# Patient Record
Sex: FEMALE | Race: OTHER | NOT HISPANIC OR LATINO | URBAN - METROPOLITAN AREA
[De-identification: names, ages, dates, MRNs, and addresses within clinical notes are randomized per-mention and may not be internally consistent; named-entity substitution may affect disease eponyms.]

---

## 2020-03-27 ENCOUNTER — EMERGENCY (EMERGENCY)
Facility: HOSPITAL | Age: 70
LOS: 1 days | Discharge: HOME | End: 2020-03-27
Payer: MEDICAID

## 2020-03-27 VITALS
OXYGEN SATURATION: 96 % | SYSTOLIC BLOOD PRESSURE: 134 MMHG | TEMPERATURE: 98 F | RESPIRATION RATE: 20 BRPM | WEIGHT: 169.98 LBS | HEIGHT: 62 IN | DIASTOLIC BLOOD PRESSURE: 85 MMHG | HEART RATE: 100 BPM

## 2020-03-27 PROCEDURE — 87635 SARS-COV-2 COVID-19 AMP PRB: CPT

## 2020-03-27 PROCEDURE — 99283 EMERGENCY DEPT VISIT LOW MDM: CPT

## 2020-03-27 PROCEDURE — 99282 EMERGENCY DEPT VISIT SF MDM: CPT

## 2020-03-27 NOTE — ED PROVIDER NOTE - OBJECTIVE STATEMENT
Prefers for son to translate over the phone (Tutu: [phone]): 69 year-old female presents with cc flu-like symptoms for 1 day. Son reports that patient has had cough x few days and since yesterday started having upper back pain and body aches. Denies any chest pain, SOB, abdominal pain, urinary symptoms, rash, photophobia, neck stiffness or any other complaints.  admitted and positive for COVID-19. Was evaluated at urgent care and had CXR showing bilateral lower lobe infiltrates.

## 2020-03-27 NOTE — ED ADULT NURSE NOTE - CAS ELECT INFOMATION PROVIDED
Patient seen and examined by MD/ACP. Patient swabbed for COVID-19 and discharged by MD/ACP./DC instructions

## 2020-03-27 NOTE — ED ADULT TRIAGE NOTE - CHIEF COMPLAINT QUOTE
MY  HAS CORONA VIRUS, THE DOCTOR TOLD ME I HAVE PNA AND TO COME HERE, REPORTS FEELS LUNG HURT WHEN I BREATHE AL LITTLE BIT

## 2020-03-27 NOTE — ED PROVIDER NOTE - NSFOLLOWUPINSTRUCTIONS_ED_ALL_ED_FT
Test result will come back in about 5 days.    Today you may or may have not been tested for the COVID-19 virus. You will have to isolate yourself for the next 14 days and then you can come out of isolation as long as you are 3 days without fever (while not taking any medications for fever).    For pain or fever you can take: Tylenol 1000 mg orally every 6 hours as needed. (Maximum 4000 mg in 24 hours).    Stay well-hydrated by drinking plenty of water daily.    ** Come back to the emergency room if you start having SHORTNESS OF BREATH, if your symptoms are worsening or if you are concerned. Otherwise stay home and isolate yourself.    **If you have friends or family members that have mild symptoms that may be due to the virus tell them to stay home    Stay home: People who are mildly ill with COVID-19 are able to recover at home. Do not leave, except to get medical care. Do not visit public areas.  Stay in touch with your doctor. Call before you get medical care. Be sure to get care if you feel worse or you think it is an emergency.  Avoid public transportation: Avoid using public transportation, ride-sharing, or taxis.  Stay away from others: As much as possible, you should stay in a specific “sick room” and away from other people in your home. Use a separate bathroom, if available.  Limit contact with pets & animals: You should restrict contact with pets and other animals, just like you would around other people.  Although there have not been reports of pets or other animals becoming sick with COVID-19, it is still recommended that people with the virus limit contact with animals until more information is known.  If you are sick: You should wear a facemask when you are around other people and before you enter a healthcare provider’s office.  If you are caring for others: If the person who is sick is not able to wear a facemask (for example, because it causes trouble breathing), then people who live in the home should stay in a different room. When caregivers enter the room of the sick person, they should wear a facemask. Visitors, other than caregivers, are not recommended.  Cover: Cover your mouth and nose with a tissue when you cough or sneeze.  Dispose: Throw used tissues in a lined trash can.  Wash hands: Immediately wash your hands with soap and water for at least 20 seconds. If soap and water are not available, clean your hands with an alcohol-based hand  that contains at least 60% alcohol.  Hand : If soap and water are not available, use an alcohol-based hand  with at least 60% alcohol, covering all surfaces of your hands and rubbing them together until they feel dry.  Soap and water: Soap and water are the best option, especially if hands are visibly dirty.  Avoid touching: Avoid touching your eyes, nose, and mouth with unwashed hands.  Do not share: Do not share dishes, drinking glasses, cups, eating utensils, towels, or bedding with other people in your home.  Wash thoroughly after use: After using these items, wash them thoroughly with soap and water or put in the .  Clean and disinfect: Routinely clean high-touch surfaces in your “sick room” and bathroom. Let someone else clean and disinfect surfaces in common areas, but not your bedroom and bathroom.  If a caregiver or other person needs to clean and disinfect a sick person’s bedroom or bathroom, they should do so on an as-needed basis. The caregiver/other person should wear a mask and wait as long as possible after the sick person has used the bathroom.  High-touch surfaces include phones, remote controls, counters, tabletops, doorknobs, bathroom fixtures, toilets, keyboards, tablets, and bedside tables.  Clean and disinfect areas that may have blood, stool, or body fluids on them.  Seek medical attention, but call first: Seek medical care right away if your illness is worsening (for example, if you have difficulty breathing).  Call your doctor before going in: Before going to the doctor’s office or emergency room, call ahead and tell them your symptoms. They will tell you what to do.  Wear a facemask: If possible, put on a facemask before you enter the building. If you can’t put on a facemask, try to keep a safe distance from other people (at least 6 feet away). This will help protect the people in the office or waiting room.  Follow care instructions from your healthcare provider and local health department: Your local health authorities will give instructions on checking your symptoms and reporting information.  Call 911 if you have a medical emergency: If you have a medical emergency and need to call 911, notify the  that you have or think you might have, COVID-19. If possible, put on a facemask before medical help arrives.

## 2020-03-27 NOTE — ED PROVIDER NOTE - CLINICAL SUMMARY MEDICAL DECISION MAKING FREE TEXT BOX
Well-appearing, vitals stable. O2 sat WNL. No signs of distress. No SPEARS. High suspicion of COVID-19 infection. Will swab. Will discharge with isolation precautions and strict return instructions, discussed with son over the phone and patient informed by son. Questions answered.

## 2020-03-27 NOTE — ED PROVIDER NOTE - PHYSICAL EXAMINATION
CONSTITUTIONAL: Well appearing, awake, alert, oriented to person, place, time/situation and in no apparent distress. EYES: Clear bilaterally, pupils equal, round and reactive to light. CARDIAC: Normal rate, regular rhythm.  Heart sounds S1, S2. RESPIRATORY: Breath sounds clear and equal bilaterally. O2 sat 97% RA after ambulating for 30 seconds. No use of accessory muscles or signs of distress. GASTROINTESTINAL: Abdomen soft, non-tender, no guarding. NEUROLOGICAL: Alert and oriented, no focal deficits, grossly intact. SKIN: Skin normal color for race, warm, dry and intact. No evidence of rash.

## 2020-03-28 LAB — SARS-COV-2 RNA SPEC QL NAA+PROBE: DETECTED

## 2024-12-11 ENCOUNTER — INPATIENT (INPATIENT)
Facility: HOSPITAL | Age: 74
LOS: 1 days | Discharge: HOME | DRG: 189 | End: 2024-12-13
Attending: INTERNAL MEDICINE | Admitting: INTERNAL MEDICINE
Payer: MEDICAID

## 2024-12-11 VITALS
SYSTOLIC BLOOD PRESSURE: 146 MMHG | TEMPERATURE: 98 F | RESPIRATION RATE: 16 BRPM | DIASTOLIC BLOOD PRESSURE: 79 MMHG | HEART RATE: 80 BPM | HEIGHT: 62 IN | WEIGHT: 149.91 LBS | OXYGEN SATURATION: 90 %

## 2024-12-11 DIAGNOSIS — Z29.9 ENCOUNTER FOR PROPHYLACTIC MEASURES, UNSPECIFIED: ICD-10-CM

## 2024-12-11 DIAGNOSIS — I10 ESSENTIAL (PRIMARY) HYPERTENSION: ICD-10-CM

## 2024-12-11 DIAGNOSIS — R93.89 ABNORMAL FINDINGS ON DIAGNOSTIC IMAGING OF OTHER SPECIFIED BODY STRUCTURES: ICD-10-CM

## 2024-12-11 DIAGNOSIS — J96.01 ACUTE RESPIRATORY FAILURE WITH HYPOXIA: ICD-10-CM

## 2024-12-11 DIAGNOSIS — J40 BRONCHITIS, NOT SPECIFIED AS ACUTE OR CHRONIC: ICD-10-CM

## 2024-12-11 LAB
ALBUMIN SERPL ELPH-MCNC: 3.7 G/DL — SIGNIFICANT CHANGE UP (ref 3.5–5)
ALP SERPL-CCNC: 77 U/L — SIGNIFICANT CHANGE UP (ref 40–120)
ALT FLD-CCNC: 17 U/L DA — SIGNIFICANT CHANGE UP (ref 10–60)
ANION GAP SERPL CALC-SCNC: 3 MMOL/L — LOW (ref 5–17)
APTT BLD: 27.5 SEC — SIGNIFICANT CHANGE UP (ref 24.5–35.6)
AST SERPL-CCNC: 9 U/L — LOW (ref 10–40)
BASE EXCESS BLDV CALC-SCNC: 3 MMOL/L — SIGNIFICANT CHANGE UP
BASOPHILS # BLD AUTO: 0.03 K/UL — SIGNIFICANT CHANGE UP (ref 0–0.2)
BASOPHILS NFR BLD AUTO: 0.3 % — SIGNIFICANT CHANGE UP (ref 0–2)
BILIRUB SERPL-MCNC: 0.6 MG/DL — SIGNIFICANT CHANGE UP (ref 0.2–1.2)
BUN SERPL-MCNC: 19 MG/DL — HIGH (ref 7–18)
CALCIUM SERPL-MCNC: 8.8 MG/DL — SIGNIFICANT CHANGE UP (ref 8.4–10.5)
CHLORIDE SERPL-SCNC: 112 MMOL/L — HIGH (ref 96–108)
CO2 SERPL-SCNC: 28 MMOL/L — SIGNIFICANT CHANGE UP (ref 22–31)
CREAT SERPL-MCNC: 0.67 MG/DL — SIGNIFICANT CHANGE UP (ref 0.5–1.3)
EGFR: 93 ML/MIN/1.73M2 — SIGNIFICANT CHANGE UP
EOSINOPHIL # BLD AUTO: 0.11 K/UL — SIGNIFICANT CHANGE UP (ref 0–0.5)
EOSINOPHIL NFR BLD AUTO: 1.2 % — SIGNIFICANT CHANGE UP (ref 0–6)
FLUAV AG NPH QL: SIGNIFICANT CHANGE UP
FLUBV AG NPH QL: SIGNIFICANT CHANGE UP
GLUCOSE SERPL-MCNC: 102 MG/DL — HIGH (ref 70–99)
HCO3 BLDV-SCNC: 29 MMOL/L — SIGNIFICANT CHANGE UP (ref 22–29)
HCT VFR BLD CALC: 39.9 % — SIGNIFICANT CHANGE UP (ref 34.5–45)
HGB BLD-MCNC: 13.2 G/DL — SIGNIFICANT CHANGE UP (ref 11.5–15.5)
IMM GRANULOCYTES NFR BLD AUTO: 0.4 % — SIGNIFICANT CHANGE UP (ref 0–0.9)
INR BLD: 0.95 RATIO — SIGNIFICANT CHANGE UP (ref 0.85–1.16)
LYMPHOCYTES # BLD AUTO: 1.61 K/UL — SIGNIFICANT CHANGE UP (ref 1–3.3)
LYMPHOCYTES # BLD AUTO: 17.1 % — SIGNIFICANT CHANGE UP (ref 13–44)
MAGNESIUM SERPL-MCNC: 2.4 MG/DL — SIGNIFICANT CHANGE UP (ref 1.6–2.6)
MCHC RBC-ENTMCNC: 30 PG — SIGNIFICANT CHANGE UP (ref 27–34)
MCHC RBC-ENTMCNC: 33.1 G/DL — SIGNIFICANT CHANGE UP (ref 32–36)
MCV RBC AUTO: 90.7 FL — SIGNIFICANT CHANGE UP (ref 80–100)
MONOCYTES # BLD AUTO: 0.7 K/UL — SIGNIFICANT CHANGE UP (ref 0–0.9)
MONOCYTES NFR BLD AUTO: 7.4 % — SIGNIFICANT CHANGE UP (ref 2–14)
NEUTROPHILS # BLD AUTO: 6.93 K/UL — SIGNIFICANT CHANGE UP (ref 1.8–7.4)
NEUTROPHILS NFR BLD AUTO: 73.6 % — SIGNIFICANT CHANGE UP (ref 43–77)
NRBC # BLD: 0 /100 WBCS — SIGNIFICANT CHANGE UP (ref 0–0)
NT-PROBNP SERPL-SCNC: 174 PG/ML — HIGH (ref 0–125)
PCO2 BLDV: 49 MMHG — HIGH (ref 39–42)
PH BLDV: 7.38 — SIGNIFICANT CHANGE UP (ref 7.32–7.43)
PHOSPHATE SERPL-MCNC: 1.8 MG/DL — LOW (ref 2.5–4.5)
PLATELET # BLD AUTO: 283 K/UL — SIGNIFICANT CHANGE UP (ref 150–400)
PO2 BLDV: 26 MMHG — SIGNIFICANT CHANGE UP
POTASSIUM SERPL-MCNC: 4.4 MMOL/L — SIGNIFICANT CHANGE UP (ref 3.5–5.3)
POTASSIUM SERPL-SCNC: 4.4 MMOL/L — SIGNIFICANT CHANGE UP (ref 3.5–5.3)
PROT SERPL-MCNC: 6.7 G/DL — SIGNIFICANT CHANGE UP (ref 6–8.3)
PROTHROM AB SERPL-ACNC: 11 SEC — SIGNIFICANT CHANGE UP (ref 9.9–13.4)
RBC # BLD: 4.4 M/UL — SIGNIFICANT CHANGE UP (ref 3.8–5.2)
RBC # FLD: 12.1 % — SIGNIFICANT CHANGE UP (ref 10.3–14.5)
RSV RNA NPH QL NAA+NON-PROBE: SIGNIFICANT CHANGE UP
SAO2 % BLDV: 37.6 % — SIGNIFICANT CHANGE UP
SARS-COV-2 RNA SPEC QL NAA+PROBE: SIGNIFICANT CHANGE UP
SODIUM SERPL-SCNC: 143 MMOL/L — SIGNIFICANT CHANGE UP (ref 135–145)
TROPONIN I, HIGH SENSITIVITY RESULT: 56.1 NG/L — HIGH
TROPONIN I, HIGH SENSITIVITY RESULT: 65.4 NG/L — HIGH
TROPONIN I, HIGH SENSITIVITY RESULT: 70.4 NG/L — HIGH
WBC # BLD: 9.42 K/UL — SIGNIFICANT CHANGE UP (ref 3.8–10.5)
WBC # FLD AUTO: 9.42 K/UL — SIGNIFICANT CHANGE UP (ref 3.8–10.5)

## 2024-12-11 PROCEDURE — 99285 EMERGENCY DEPT VISIT HI MDM: CPT

## 2024-12-11 PROCEDURE — 71275 CT ANGIOGRAPHY CHEST: CPT | Mod: 26,MC

## 2024-12-11 PROCEDURE — 71045 X-RAY EXAM CHEST 1 VIEW: CPT | Mod: 26

## 2024-12-11 PROCEDURE — 99223 1ST HOSP IP/OBS HIGH 75: CPT | Mod: GC

## 2024-12-11 RX ORDER — ACETAMINOPHEN 500MG 500 MG/1
650 TABLET, COATED ORAL EVERY 6 HOURS
Refills: 0 | Status: DISCONTINUED | OUTPATIENT
Start: 2024-12-11 | End: 2024-12-13

## 2024-12-11 RX ORDER — ALBUTEROL 90 MCG
2.5 AEROSOL (GRAM) INHALATION
Refills: 0 | Status: DISCONTINUED | OUTPATIENT
Start: 2024-12-11 | End: 2024-12-11

## 2024-12-11 RX ORDER — GUAIFENESIN/DEXTROMETHORPHAN 100-10MG/5
10 SYRUP ORAL EVERY 4 HOURS
Refills: 0 | Status: DISCONTINUED | OUTPATIENT
Start: 2024-12-11 | End: 2024-12-13

## 2024-12-11 RX ORDER — AMLODIPINE BESYLATE 10 MG/1
5 TABLET ORAL DAILY
Refills: 0 | Status: DISCONTINUED | OUTPATIENT
Start: 2024-12-11 | End: 2024-12-13

## 2024-12-11 RX ORDER — GUAIFENESIN/DEXTROMETHORPHAN 100-10MG/5
10 SYRUP ORAL EVERY 4 HOURS
Refills: 0 | Status: DISCONTINUED | OUTPATIENT
Start: 2024-12-11 | End: 2024-12-11

## 2024-12-11 RX ORDER — ENOXAPARIN SODIUM 30 MG/.3ML
40 INJECTION SUBCUTANEOUS EVERY 24 HOURS
Refills: 0 | Status: DISCONTINUED | OUTPATIENT
Start: 2024-12-11 | End: 2024-12-13

## 2024-12-11 RX ORDER — METHYLPREDNISOLONE SOD SUCC 125 MG
125 VIAL (EA) INJECTION ONCE
Refills: 0 | Status: COMPLETED | OUTPATIENT
Start: 2024-12-11 | End: 2024-12-11

## 2024-12-11 RX ORDER — IPRATROPIUM BROMIDE AND ALBUTEROL SULFATE 2.5; .5 MG/3ML; MG/3ML
3 SOLUTION RESPIRATORY (INHALATION) EVERY 6 HOURS
Refills: 0 | Status: DISCONTINUED | OUTPATIENT
Start: 2024-12-11 | End: 2024-12-13

## 2024-12-11 RX ORDER — MELOXICAM 7.5 MG/1
1 TABLET ORAL
Refills: 0 | DISCHARGE

## 2024-12-11 RX ORDER — SODIUM,POTASSIUM PHOSPHATES 278-250MG
1 POWDER IN PACKET (EA) ORAL ONCE
Refills: 0 | Status: COMPLETED | OUTPATIENT
Start: 2024-12-11 | End: 2024-12-11

## 2024-12-11 RX ORDER — IPRATROPIUM BROMIDE AND ALBUTEROL SULFATE 2.5; .5 MG/3ML; MG/3ML
3 SOLUTION RESPIRATORY (INHALATION) EVERY 6 HOURS
Refills: 0 | Status: DISCONTINUED | OUTPATIENT
Start: 2024-12-11 | End: 2024-12-11

## 2024-12-11 RX ORDER — AMLODIPINE BESYLATE 10 MG/1
1 TABLET ORAL
Refills: 0 | DISCHARGE

## 2024-12-11 RX ORDER — IPRATROPIUM BROMIDE AND ALBUTEROL SULFATE 2.5; .5 MG/3ML; MG/3ML
3 SOLUTION RESPIRATORY (INHALATION) ONCE
Refills: 0 | Status: COMPLETED | OUTPATIENT
Start: 2024-12-11 | End: 2024-12-11

## 2024-12-11 RX ADMIN — IPRATROPIUM BROMIDE AND ALBUTEROL SULFATE 3 MILLILITER(S): 2.5; .5 SOLUTION RESPIRATORY (INHALATION) at 16:20

## 2024-12-11 RX ADMIN — Medication 10 MILLILITER(S): at 22:01

## 2024-12-11 RX ADMIN — Medication 1 PACKET(S): at 17:20

## 2024-12-11 RX ADMIN — ENOXAPARIN SODIUM 40 MILLIGRAM(S): 30 INJECTION SUBCUTANEOUS at 22:01

## 2024-12-11 RX ADMIN — Medication 125 MILLIGRAM(S): at 17:20

## 2024-12-11 RX ADMIN — IPRATROPIUM BROMIDE AND ALBUTEROL SULFATE 3 MILLILITER(S): 2.5; .5 SOLUTION RESPIRATORY (INHALATION) at 12:40

## 2024-12-11 NOTE — H&P ADULT - HISTORY OF PRESENT ILLNESS
Patient is a 74 year old female from home, ambulates independently, with PMH of HTN, and remote hisotory of colon cancer who presented to the ED with shortness of breath.  Patients son at bedside assisting with history.  Patient states she has felt sick for the past 1-2 weeks.  Patient states she has had cough and fever for this time.  patient denies sick contact.  patient states with the coughing she has had b/l chest wall discomfort.  Patient went to urgent care for this shortness of breath this morning and was told to come to the hospital due to her hypoxia. Patient denies nausea, vomiting, headache, blurry vision, dizziness, chest pain, abdominal pain, constipation, diarrhea, leg swelling.  Patient is a 74 year old female from home, ambulates independently, with PMH of HTN, and remote history of colon cancer s/p treatment in early 2000s, who presented to the ED with SOB.  Patients son at bedside assisting with history.  Patient states she has felt sick for the past 1-2 weeks since Thanksgiving.  Initially, patient states her symptoms began with fevers for 4d, and dry cough. Patient now states she has worsening R-sided breast and back pain with deep breaths, 7/10 in intensity. Denies rhinorrhea, sick contact, travel, abdominal pain, nausea/vomiting, chest pain, chills, or urinary symptoms. Of note, patient went to urgent care for this shortness of breath this morning and was told to come to the hospital due to her hypoxia.     In the ED patient received duonebs, solumedrol, KPhos. VSS, on RA saturating 95-96%

## 2024-12-11 NOTE — ED ADULT NURSE NOTE - NS ED NURSE LEVEL OF CONSCIOUSNESS MENTAL STATUS
Date Performed: 01/01/2018       Time Performed: 02:50:25

 

PTAGE:      58 years

 

EKG:      ATRIAL FIBRILLATION WITH RAPID VENTRICULAR RESPONSE NONSPECIFIC ST & T-WAVE ABNORMALITY ABN
ORMAL ECG No significant change from prior electrocardiogram. 

 

 PREVIOUS TRACING            : 11/28/2017 15.12

 

DOCTOR:   Luis Alberto Hightower  Interpretating Date/Time  01/01/2018 09:04:25
Date Performed: 01/01/2018       Time Performed: 10:34:01

 

PTAGE:      58 years

 

EKG:      ATRIAL FIBRILLATION WITH RAPID VENTRICULAR RESPONSE WITH ABERRANT CONDUCTION OR VENTRICULAR
 PREMATURE COMPLEXES NONSPECIFIC ST & T-WAVE ABNORMALITY ABNORMAL RHYTHM ECG INTERPRETATION BASED ON 
A DEFAULT AGE OF 40 YEARS 

 

 PREVIOUS TRACING            : 01/01/2018 02.50 Compared to prior tracing no significant change

 

DOCTOR:   Claudine Brooks  Interpretating Date/Time  01/02/2018 17:34:21
Awake/Alert

## 2024-12-11 NOTE — H&P ADULT - NSHPREVIEWOFSYSTEMS_GEN_ALL_CORE
CONSTITUTIONAL: No fever, weight loss, or fatigue  RESPIRATORY: +cough, wheezing, chills or hemoptysis; +shortness of breath  CARDIOVASCULAR: No chest pain, palpitations, dizziness, or leg swelling  GASTROINTESTINAL: No abdominal pain. No nausea, vomiting, or hematemesis; No diarrhea or constipation. No melena or hematochezia.  GENITOURINARY: No dysuria or hematuria, urinary frequency  NEUROLOGICAL: No headaches, memory loss, loss of strength, numbness, or tremors  ENDOCRINE: No polyuria, polydipsia, or heat/cold intolerance  MUSKULOSKELETAL: No muscle aches, joint pains  HEME: no easy bruisability, no tender or enlarged lymph nodes  SKIN: No itching, burning, rashes, or lesions .

## 2024-12-11 NOTE — H&P ADULT - ASSESSMENT
Patient is a 74 year old female from home, ambulates independently, with PMH of HTN, and remote hisotory of colon cancer who presented to the ED with shortness of breath.  In the ED patient hemodynamically stable and afebrile on 2L NC.  Patients labs grossly unrevaling.  Patient is being admitted to medicine for. Patient is a 74 year old female from home, ambulates independently, with PMH of HTN, and remote history of colon cancer who presented to the ED with shortness of breath.  In the ED patient hemodynamically stable and afebrile on 2L NC.  Patients labs grossly unrevaling.  Patient is being admitted to medicine for Acute hypoxic respiratory failure 2/2 bronchitis  Patient is a 74 year old female from home, ambulates independently, with PMH of HTN, and remote history of colon cancer who presented to the ED with shortness of breath.  In the ED patient hemodynamically stable and afebrile on RA.  Patients labs grossly unrevealing  Patient is being admitted to medicine for Acute hypoxic respiratory failure 2/2 viral bronchitis

## 2024-12-11 NOTE — H&P ADULT - PROBLEM SELECTOR PLAN 1
- Presented with URI symptoms (shortness of breath and cough)  - Hemodynamically stable and afebrile  - WBC 9  - Lactate <2  - CT Chest showing  Mild bilateral bronchial wall thickening with mucoid impaction  - CXR negative for acute intrathoracic pathology  - S/P solumedrol 125mg and Duoneb in the ED  - HOLD antibiotics for now  - Start duonebs  - Start expecterant   - Supportive care  - IV Fluids - Presented with URI symptoms (shortness of breath and cough)  - Hemodynamically stable and afebrile  - WBC 9  - Lactate <2  - CT Chest showing  Mild bilateral bronchial wall thickening with mucoid impaction  - CXR negative for acute intrathoracic pathology  - S/P solumedrol 125mg and Duoneb in the ED  - HOLD antibiotics for now  - Currently on 2L NC, titrate down as tolerated  - Tylenol PRN  - Start Duoneb  - Start expecterant   - Supportive care  - IV Fluids - Presented with URI symptoms (shortness of breath and cough)  - Hemodynamically stable and afebrile  - WBC 9  - Lactate <2, Trop 56   - CT Chest showing  Mild bilateral bronchial wall thickening with mucoid impaction  - CXR negative for acute intrathoracic pathology  - S/P solumedrol 125mg and Duoneb in the ED  - HOLD antibiotics for now  - Weaned off supplemental O2  - Tylenol PRN  - Start Duoneb  - Start robitussin  - Supportive care  - f/u repeat Trop

## 2024-12-11 NOTE — ED PROVIDER NOTE - PHYSICAL EXAMINATION
CONSTITUTIONAL: non-toxic, well appearing  SKIN: no rash, no petechiae.  EYES: pink conjunctiva, anicteric  NECK: Supple; no meningismus, no JVD  CARD: RRR, no murmurs, equal radial pulses bilaterally 2+  RESP: Diminished breath sounds bilaterally with end expiratory wheeze, no respiratory distress  ABD: Soft, non-tender, non-distended, no peritoneal signs, no CVA tenderness  EXT: Normal ROM x4. No edema. No calf tenderness  NEURO: Alert, oriented. Neuro exam nonfocal, equal strength bilaterally  PSYCH: Cooperative, appropriate.

## 2024-12-11 NOTE — H&P ADULT - PROBLEM SELECTOR PLAN 4
- Patient with history of HTN  - Patient on Amlodipine 5mg at home  - Continue home antihypertensives with holding parameters

## 2024-12-11 NOTE — ED ADULT NURSE NOTE - OBJECTIVE STATEMENT
Patient is BIB son  c/o coughing  and difficulty to breath x 2 weeks, c/o anterior rib pain with breathing in, Patient is placed on cardiac monitor, EKG performed and MD juan bedside. Patient is placed on 2LNC and sating at 93%. Patient denies N/V/D.

## 2024-12-11 NOTE — ED ADULT NURSE NOTE - NSFALLRISKINTERV_ED_ALL_ED

## 2024-12-11 NOTE — ED PROVIDER NOTE - OBJECTIVE STATEMENT
Patient declines Nate , son at bedside assisting interpretation.    74-year-old female with past medical history hypertension, remote history of colon cancer presents with difficulty breathing over the past 2 weeks, worsening today.  Patient reports dry cough, bilateral chest and back discomfort, worse with deep breathing and coughing of the past 2 weeks.  Denies any fevers, nasal congestion, sore throat, abdominal pain, vomiting, diarrhea, dysuria, numbness, focal weakness, rash.  Denies any history of asthma or tobacco use.  Patient seen urgent care prior to arrival, advised to the emergency department for further evaluation.  Denies any additional complaints.

## 2024-12-11 NOTE — H&P ADULT - NSHPPHYSICALEXAM_GEN_ALL_CORE
T(C): 37.1 (12-11-24 @ 15:33), Max: 37.1 (12-11-24 @ 15:33)  T(F): 98.7 (12-11-24 @ 15:33), Max: 98.7 (12-11-24 @ 15:33)  HR: 79 (12-11-24 @ 15:33) (78 - 80)  BP: 124/79 (12-11-24 @ 15:33) (124/79 - 146/79)  RR: 16 (12-11-24 @ 15:33) (16 - 16)  SpO2: 94% (12-11-24 @ 15:33) (90% - 94%)      GENERAL: NAD; lying in bed  HEAD:  Atraumatic, Normocephalic  EYES: EOMI, PERRLA, conjunctiva and sclera clear  ENMT: No tonsillar erythema, exudates, or enlargement; Moist mucous membranes  NECK: Supple, normal appearance, No JVD; Normal thyroid; Trachea midline  NERVOUS SYSTEM:  Alert & Oriented X3,  Motor Strength 5/5 B/L upper and lower extremities, sensation intact  CHEST/LUNG: Lungs clear to auscultation bilaterally, No rales, rhonchi, wheezing   HEART: Regular rate and rhythm; No murmurs, rubs, or gallops  ABDOMEN: Soft, Nontender, Nondistended; Bowel sounds present  EXTREMITIES:  2+ Peripheral Pulses, No clubbing, cyanosis, or edema  SKIN: No rashes or lesions; T(C): 37.1 (12-11-24 @ 15:33), Max: 37.1 (12-11-24 @ 15:33)  T(F): 98.7 (12-11-24 @ 15:33), Max: 98.7 (12-11-24 @ 15:33)  HR: 79 (12-11-24 @ 15:33) (78 - 80)  BP: 124/79 (12-11-24 @ 15:33) (124/79 - 146/79)  RR: 16 (12-11-24 @ 15:33) (16 - 16)  SpO2: 94% (12-11-24 @ 15:33) (90% - 94%)      GENERAL: NAD; lying in bed  HEAD:  Atraumatic, Normocephalic  EYES: EOMI, PERRLA, conjunctiva and sclera clear  ENMT: No tonsillar erythema, exudates, or enlargement; Moist mucous membranes  NECK: Supple, normal appearance, No JVD; Normal thyroid; Trachea midline  NERVOUS SYSTEM:  Alert & Oriented X3,  Motor Strength 5/5 B/L upper and lower extremities, sensation intact  CHEST/LUNG: CTLA. No rales, rhonchi, wheezing   HEART: Regular rate and rhythm; No murmurs, rubs, or gallops  ABDOMEN: Soft, Nontender, Nondistended; Bowel sounds present  EXTREMITIES:  2+ Peripheral Pulses, No clubbing, cyanosis, or edema  SKIN: No rashes or lesions;

## 2024-12-11 NOTE — H&P ADULT - REASON FOR ADMISSION
Acute hypoxic respiratory failure 2/2 community acquired pneumonia Acute hypoxic respiratory failure 2/2 bronchitis

## 2024-12-11 NOTE — H&P ADULT - PROBLEM SELECTOR PLAN 3
-CT Chest:   - No pulmonary embolism, Mild bilateral bronchial wall thickening with mucoid impaction.   - Filling defect in the left atrial appendage may represent thrombus versus slow flow of contrast.   - Heterogeneous appearing thyroid with hypodense nodule.    - Will f/u Echo -CT Chest:   - No pulmonary embolism, Mild bilateral bronchial wall thickening with mucoid impaction.   - Filling defect in the left atrial appendage may represent thrombus versus slow flow of contrast.   - Heterogeneous appearing thyroid with hypodense nodule.    - Will f/u Echo  - Thyroid US outpatient

## 2024-12-11 NOTE — ED PROVIDER NOTE - CLINICAL SUMMARY MEDICAL DECISION MAKING FREE TEXT BOX
Lee: 74-year-old female with past medical history hypertension, remote history of colon cancer presents with difficulty breathing over the past 2 weeks, worsening today.  Patient reports dry cough, bilateral chest and back discomfort, worse with deep breathing and coughing of the past 2 weeks.  Denies any fevers, nasal congestion, sore throat, abdominal pain, vomiting, diarrhea, dysuria, numbness, focal weakness, rash.  Denies any history of asthma or tobacco use.  Patient seen urgent care prior to arrival, advised to the emergency department for further evaluation.  Physical exam per above. Patient hypoxic to 89% on RA requiring 2L NC. Unclear etiology, ddx includes but not limited to infectious, fluid overload, PE. Will obtain labs, imaging, provide supportive treatment with dispo pending workup.

## 2024-12-11 NOTE — ED PROVIDER NOTE - PROGRESS NOTE DETAILS
Wiliamks-DO: Patient with elevated troponin, persistently hypoxic requiring 2 L nasal cannula, will admit.  Discussed with hospitalist and MAR regarding admission.

## 2024-12-11 NOTE — H&P ADULT - ATTENDING COMMENTS
Ms. Wallace is an Dutch speaking 74 year old female from home, ambulates independently, with PMH of HTN, and remote history of colon cancer who presented to the ED with shortness of breath. Patient's son assisted w/ hs, he informed that she has been sick for last 1-2 weeks- had fever, cough and chills. He thinks her cough is better but she c/o Rt sided chest pain with breathing, hence,  he brought her to the urgent care. She was hypoxic at  and was advised to visit ER.     In ED, initial VS were stable except SPO2 90% on RA, she was placed on NC- O2 sats improved to 94%     Labs reviewed- cbc, bmp, pro-bnp, CE     PE as above     CT Angio chest reviewed- No pulmonary embolism. Mild bilateral bronchial wall thickening with mucoid impaction.     A/P:  #Acute Hypoxic respiratory failure   #Viral bronchitis   #? Atrial thrombus vs delayed contrast   #Elevated CE- suspect demand   #HTN  #Thyroid nodule   #DVT ppx     Plan:  --Patient p/w sob, pleuritic chest pain and hypoxia. CT Angio chest-No pulmonary embolism. Mild bilateral bronchial wall thickening with mucoid impaction. Filling defect in the left atrial appendage may represent thrombus versus slow flow of contrast.  -Patient is likely recovering from a viral infection (her sister was also sick w/ URI), CT w/ some mucoid plugging noticed.   -Hold off abx as no fevers, WBC or obvious consolidation   -Start duonebs, Mucinex for mucolytic effect   -Weaned off to RA, latest sats 93-94%   -Given abnormal CT finding of possible atrial thrombus or delayed contrast, would get ECHO for better evaluation   -Trend CE   -US thyroid as out-patient   -Lovenox SC

## 2024-12-11 NOTE — H&P ADULT - NSICDXPASTMEDICALHX_GEN_ALL_CORE_FT
PAST MEDICAL HISTORY:  Colon cancer     COPD (chronic obstructive pulmonary disease)     HTN (hypertension)      PAST MEDICAL HISTORY:  Colon cancer     HTN (hypertension)

## 2024-12-11 NOTE — H&P ADULT - ATTENDING SUPERVISION STATEMENT
-- DO NOT REPLY / DO NOT REPLY ALL --  -- Message is from the [facility]--    General Patient Message      Reason for Call: Patient is requesting a call back from Dr. Rocio Aguilera's office, patient advsied she received a voicemail regarding issues with insurance not authorizing Thyroid Imagining. Please give a call back as soon as possible, patient is scheduled for imaging tomorrow 04/12/22 at 9:00 am.     Caller Information       Type Contact Phone    04/11/2022 11:11 AM CDT Phone (Incoming) Brenda Tucker (Self) [phone] (M)          Alternative phone number: none    Turnaround time given to caller:   \"This message will be sent to [state Provider's name]. The clinical team will fulfill your request as soon as they review your message.\"     Resident

## 2024-12-12 DIAGNOSIS — Z75.8 OTHER PROBLEMS RELATED TO MEDICAL FACILITIES AND OTHER HEALTH CARE: ICD-10-CM

## 2024-12-12 LAB
ANION GAP SERPL CALC-SCNC: 6 MMOL/L — SIGNIFICANT CHANGE UP (ref 5–17)
BUN SERPL-MCNC: 21 MG/DL — HIGH (ref 7–18)
CALCIUM SERPL-MCNC: 8.9 MG/DL — SIGNIFICANT CHANGE UP (ref 8.4–10.5)
CHLORIDE SERPL-SCNC: 112 MMOL/L — HIGH (ref 96–108)
CO2 SERPL-SCNC: 25 MMOL/L — SIGNIFICANT CHANGE UP (ref 22–31)
CREAT SERPL-MCNC: 0.7 MG/DL — SIGNIFICANT CHANGE UP (ref 0.5–1.3)
EGFR: 92 ML/MIN/1.73M2 — SIGNIFICANT CHANGE UP
GLUCOSE BLDC GLUCOMTR-MCNC: 91 MG/DL — SIGNIFICANT CHANGE UP (ref 70–99)
GLUCOSE SERPL-MCNC: 238 MG/DL — HIGH (ref 70–99)
HCT VFR BLD CALC: 40 % — SIGNIFICANT CHANGE UP (ref 34.5–45)
HGB BLD-MCNC: 13.2 G/DL — SIGNIFICANT CHANGE UP (ref 11.5–15.5)
MAGNESIUM SERPL-MCNC: 2.4 MG/DL — SIGNIFICANT CHANGE UP (ref 1.6–2.6)
MCHC RBC-ENTMCNC: 29.3 PG — SIGNIFICANT CHANGE UP (ref 27–34)
MCHC RBC-ENTMCNC: 33 G/DL — SIGNIFICANT CHANGE UP (ref 32–36)
MCV RBC AUTO: 88.9 FL — SIGNIFICANT CHANGE UP (ref 80–100)
NRBC # BLD: 0 /100 WBCS — SIGNIFICANT CHANGE UP (ref 0–0)
PHOSPHATE SERPL-MCNC: 2.7 MG/DL — SIGNIFICANT CHANGE UP (ref 2.5–4.5)
PLATELET # BLD AUTO: 306 K/UL — SIGNIFICANT CHANGE UP (ref 150–400)
POTASSIUM SERPL-MCNC: 3.7 MMOL/L — SIGNIFICANT CHANGE UP (ref 3.5–5.3)
POTASSIUM SERPL-SCNC: 3.7 MMOL/L — SIGNIFICANT CHANGE UP (ref 3.5–5.3)
RBC # BLD: 4.5 M/UL — SIGNIFICANT CHANGE UP (ref 3.8–5.2)
RBC # FLD: 12.1 % — SIGNIFICANT CHANGE UP (ref 10.3–14.5)
SODIUM SERPL-SCNC: 143 MMOL/L — SIGNIFICANT CHANGE UP (ref 135–145)
WBC # BLD: 7.89 K/UL — SIGNIFICANT CHANGE UP (ref 3.8–10.5)
WBC # FLD AUTO: 7.89 K/UL — SIGNIFICANT CHANGE UP (ref 3.8–10.5)

## 2024-12-12 PROCEDURE — 93306 TTE W/DOPPLER COMPLETE: CPT | Mod: 26

## 2024-12-12 PROCEDURE — 99233 SBSQ HOSP IP/OBS HIGH 50: CPT

## 2024-12-12 RX ADMIN — Medication 10 MILLILITER(S): at 10:20

## 2024-12-12 RX ADMIN — Medication 10 MILLILITER(S): at 21:26

## 2024-12-12 RX ADMIN — Medication 10 MILLILITER(S): at 17:01

## 2024-12-12 RX ADMIN — Medication 10 MILLILITER(S): at 14:10

## 2024-12-12 RX ADMIN — Medication 10 MILLILITER(S): at 05:14

## 2024-12-12 RX ADMIN — IPRATROPIUM BROMIDE AND ALBUTEROL SULFATE 3 MILLILITER(S): 2.5; .5 SOLUTION RESPIRATORY (INHALATION) at 03:32

## 2024-12-12 RX ADMIN — AMLODIPINE BESYLATE 5 MILLIGRAM(S): 10 TABLET ORAL at 05:14

## 2024-12-12 RX ADMIN — ENOXAPARIN SODIUM 40 MILLIGRAM(S): 30 INJECTION SUBCUTANEOUS at 21:26

## 2024-12-12 NOTE — PROGRESS NOTE ADULT - ASSESSMENT
Patient is a 74 year old female from home, ambulates independently, with PMH of HTN, and remote history of colon cancer who presented to the ED with shortness of breath.  In the ED patient hemodynamically stable and afebrile on RA.  Patients labs grossly unrevealing  Patient is being admitted to medicine for Acute hypoxic respiratory failure 2/2 viral bronchitis   Pt appears well and symptoms improving, O2 discontinued and incentive spirometer in place.  Patient is a 74 year old female from home, ambulates independently, with PMH of HTN, and remote history of colon cancer who presented to the ED with shortness of breath.  In the ED patient hemodynamically stable and afebrile on RA.  Patients labs grossly unrevealing  Patient is being admitted to medicine for Acute hypoxic respiratory failure 2/2 viral bronchitis   Pt appears well and symptoms improving, O2 discontinued 92% RA and incentive spirometer in place.

## 2024-12-12 NOTE — PROGRESS NOTE ADULT - NS ATTEND AMEND GEN_ALL_CORE FT
Patient seen and examined with     74 year old female from home, ambulates independently, with PMH of HTN, and remote history of colon cancer who presented to the ED with shortness of breath. Patient's son assisted w/ hs, he informed that she has been sick for last 1-2 weeks- had fever, cough and chills. He thinks her cough is better but she c/o Rt sided chest pain with breathing, hence,  he brought her to the urgent care. She was hypoxic at  and was advised to visit ER.     In ED, initial VS were stable except SPO2 90% on RA, she was placed on NC- O2 sats improved to 94%     Vital Signs Last 24 Hrs  T(C): 36.7 (12 Dec 2024 13:46), Max: 37.2 (11 Dec 2024 19:39)  T(F): 98 (12 Dec 2024 13:46), Max: 99 (11 Dec 2024 19:39)  HR: 75 (12 Dec 2024 13:46) (74 - 85)  BP: 114/73 (12 Dec 2024 13:46) (114/73 - 136/79)  BP(mean): 96 (12 Dec 2024 05:20) (85 - 96)  RR: 17 (12 Dec 2024 13:46) (16 - 17)  SpO2: 90% (12 Dec 2024 13:46) (90% - 95%): room air    Labs:                        13.2   7.89  )-----------( 306      ( 12 Dec 2024 05:53 )             40.0     12-12    143  |  112[H]  |  21[H]  ----------------------------<  238[H]  3.7   |  25  |  0.70    Ca    8.9      12 Dec 2024 05:53  Phos  2.7     12-12  Mg     2.4     12-12    TPro  6.7  /  Alb  3.7  /  TBili  0.6  /  DBili  x   /  AST  9[L]  /  ALT  17  /  AlkPhos  77  12-11      PE as above     CT Angio chest reviewed- No pulmonary embolism. Mild bilateral bronchial wall thickening with mucoid impaction.     A/P:  #Acute Hypoxic respiratory failure   #Viral bronchitis   #? Atrial thrombus vs delayed contrast   #Elevated CE- suspect demand   #HTN  #Thyroid nodule   #DVT ppx     Plan:  --Patient p/w sob, pleuritic chest pain and hypoxia. CT Angio chest-No pulmonary embolism. Mild bilateral bronchial wall thickening with mucoid impaction. Filling defect in the left atrial appendage may represent thrombus versus slow flow of contrast.  -Patient is likely recovering from a viral infection (her sister was also sick w/ URI), CT w/ some mucoid plugging noticed.   -Hold off abx as no fevers, WBC or obvious consolidation   -Start duonebs, Mucinex for mucolytic effect   -Weaned off to RA, latest sats 93-94%   -Given abnormal CT finding of possible atrial thrombus or delayed contrast, would get ECHO for better evaluation   -Trend CE   -US thyroid as out-patient   -Lovenox SC Patient seen and examined this morning with Son at bedside translating    74 year old female from home, ambulates independently, with PMH of HTN, and remote history of colon cancer who presented to the ED with shortness of breath. Patient's son assisted w/ hs, he informed that she has been sick for last 1-2 weeks- had fever, cough and chills. He thinks her cough is better but she c/o Rt sided chest pain with breathing, hence,  he brought her to the urgent care. She was hypoxic at  and was advised to visit ER.     In ED, initial VS were stable except SPO2 90% on RA, she was placed on NC- O2 sats improved to 94%    Patient doing well, NAD; took off NC saturating 92% on RA at rest; on ear lobe 95% RA     Vital Signs Last 24 Hrs  T(C): 36.7 (12 Dec 2024 13:46), Max: 37.2 (11 Dec 2024 19:39)  T(F): 98 (12 Dec 2024 13:46), Max: 99 (11 Dec 2024 19:39)  HR: 75 (12 Dec 2024 13:46) (74 - 85)  BP: 114/73 (12 Dec 2024 13:46) (114/73 - 136/79)  BP(mean): 96 (12 Dec 2024 05:20) (85 - 96)  RR: 17 (12 Dec 2024 13:46) (16 - 17)  SpO2: 90% (12 Dec 2024 13:46) (90% - 95%): room air    Labs:                        13.2   7.89  )-----------( 306      ( 12 Dec 2024 05:53 )             40.0     12-12  143  |  112[H]  |  21[H]  ----------------------------<  238[H]  3.7   |  25  |  0.70    Ca    8.9      12 Dec 2024 05:53  Phos  2.7     12-12  Mg     2.4     12-12  TPro  6.7  /  Alb  3.7  /  TBili  0.6  /  DBili  x   /  AST  9[L]  /  ALT  17  /  AlkPhos  77  12-11    P/E:  Psych: AAO x3  Neuro: No gross focal deficits; Power and sensation intact  CVS: S1S2 present, regular, no edema  Resp: BLAE+, No wheeze or Rhonchi; decreased BS at bases  GI: Soft, BS+, Non tender, non distended  Extr: No  calf tenderness B/L Lower extremities  Skin: Warm and moist without any rashes    Labs: reviewed as below; stable CBC, CMP                     13.2   7.89  )-----------( 306      ( 12 Dec 2024 05:53 )             40.0   12-12    143  |  112[H]  |  21[H]  ----------------------------<  238[H]  3.7   |  25  |  0.70  Ca    8.9      12 Dec 2024 05:53  Phos  2.7     12-12  Mg     2.4     12-12    TPro  6.7  /  Alb  3.7  /  TBili  0.6  /  DBili  x   /  AST  9[L]  /  ALT  17  /  AlkPhos  77  12-11      A/P:  #Acute Hypoxic respiratory failure likely   #Acute Viral bronchitis   #? Atrial thrombus vs delayed contrast   #Elevated troponin likely demand ischemia  #HTN  #Thyroid nodule   #DVT ppx     Plan:  CT Angio chest-No pulmonary embolism. Mild bilateral bronchial wall thickening with mucoid impaction. Filling defect in the left atrial appendage may represent thrombus versus slow flow of contrast.  Patient is likely recovering from a viral infection (her sister was also sick w/ URI), CT w/ some mucoid plugging noticed.   Hold off abx as no fevers, WBC or obvious consolidation   Continue duoneroverto Mucinex for mucolytic effect   Weaned off to RA, latest sats 93-94%; Added Incentive spirometry; Pt given and demonstrated use  Given abnormal CT finding of possible atrial thrombus or delayed contrast; follow up ECHO for better evaluation; d/w dept; completed  US thyroid as out-patient   Lovenox SQ    If ECHO insignificant and saturating well with ambulation, D/C Plan AM; d/w Son at bedside  Discussed with EDER Panda and RN

## 2024-12-12 NOTE — PROGRESS NOTE ADULT - PROBLEM SELECTOR PLAN 1
- Presented with URI symptoms (shortness of breath and cough)  - Hemodynamically stable and afebrile  - WBC 9  - Lactate <2, Trop 56   - CT Chest showing  Mild bilateral bronchial wall thickening with mucoid impaction  - CXR negative for acute intrathoracic pathology  - S/P solumedrol 125mg and Duoneb in the ED  - HOLD antibiotics for now  - Weaned off supplemental O2  - Tylenol PRN  - Start Duoneb  - Start robitussin  - Supportive care  -incentive spirometer

## 2024-12-12 NOTE — PROGRESS NOTE ADULT - SUBJECTIVE AND OBJECTIVE BOX
NP Note discussed with  Primary Attending    Patient is a 74y old  Female who presents with a chief complaint of Acute hypoxic respiratory failure 2/2 bronchitis (11 Dec 2024 16:20)      INTERVAL HPI/OVERNIGHT EVENTS: no new complaints    MEDICATIONS  (STANDING):  albuterol/ipratropium for Nebulization 3 milliLiter(s) Nebulizer every 6 hours  amLODIPine   Tablet 5 milliGRAM(s) Oral daily  enoxaparin Injectable 40 milliGRAM(s) SubCutaneous every 24 hours  guaifenesin/dextromethorphan Oral Liquid 10 milliLiter(s) Oral every 4 hours    MEDICATIONS  (PRN):  acetaminophen     Tablet .. 650 milliGRAM(s) Oral every 6 hours PRN Temp greater or equal to 38C (100.4F), Mild Pain (1 - 3)      __________________________________________________  REVIEW OF SYSTEMS:    CONSTITUTIONAL: No fever,   EYES: no acute visual disturbances  NECK: No pain or stiffness  RESPIRATORY: No cough; No shortness of breath  CARDIOVASCULAR: No chest pain, no palpitations  GASTROINTESTINAL: No pain. No nausea or vomiting; No diarrhea   NEUROLOGICAL: No headache or numbness, no tremors  MUSCULOSKELETAL: No joint pain, no muscle pain  GENITOURINARY: no dysuria, no frequency, no hesitancy  PSYCHIATRY: no depression , no anxiety  ALL OTHER  ROS negative        Vital Signs Last 24 Hrs  T(C): 36.8 (12 Dec 2024 09:59), Max: 37.2 (11 Dec 2024 19:39)  T(F): 98.2 (12 Dec 2024 09:59), Max: 99 (11 Dec 2024 19:39)  HR: 82 (12 Dec 2024 09:59) (74 - 85)  BP: 133/76 (12 Dec 2024 09:59) (115/70 - 136/79)  BP(mean): 96 (12 Dec 2024 05:20) (85 - 96)  RR: 16 (12 Dec 2024 09:59) (16 - 16)  SpO2: 94% (12 Dec 2024 09:59) (93% - 95%)    Parameters below as of 12 Dec 2024 09:59  Patient On (Oxygen Delivery Method): nasal cannula  O2 Flow (L/min): 2      ________________________________________________  PHYSICAL EXAM:  GENERAL: NAD  HEENT: Normocephalic;  conjunctivae and sclerae clear; moist mucous membranes;   NECK : supple  CHEST/LUNG: Clear to auscultation bilaterally with good air entry   HEART: S1 S2  regular; no murmurs, gallops or rubs  ABDOMEN: Soft, Nontender, Nondistended; Bowel sounds present  EXTREMITIES: no cyanosis; no edema; no calf tenderness  SKIN: warm and dry; no rash  NERVOUS SYSTEM:  Awake and alert; Oriented  to place, person and time ; no new deficits    _________________________________________________  LABS:                        13.2   7.89  )-----------( 306      ( 12 Dec 2024 05:53 )             40.0     12-12    143  |  112[H]  |  21[H]  ----------------------------<  238[H]  3.7   |  25  |  0.70    Ca    8.9      12 Dec 2024 05:53  Phos  2.7     12-12  Mg     2.4     12-12    TPro  6.7  /  Alb  3.7  /  TBili  0.6  /  DBili  x   /  AST  9[L]  /  ALT  17  /  AlkPhos  77  12-11    PT/INR - ( 11 Dec 2024 12:15 )   PT: 11.0 sec;   INR: 0.95 ratio         PTT - ( 11 Dec 2024 12:15 )  PTT:27.5 sec  Urinalysis Basic - ( 12 Dec 2024 05:53 )    Color: x / Appearance: x / SG: x / pH: x  Gluc: 238 mg/dL / Ketone: x  / Bili: x / Urobili: x   Blood: x / Protein: x / Nitrite: x   Leuk Esterase: x / RBC: x / WBC x   Sq Epi: x / Non Sq Epi: x / Bacteria: x      CAPILLARY BLOOD GLUCOSE            RADIOLOGY & ADDITIONAL TESTS:  < from: CT Angio Chest PE Protocol w/ IV Cont (12.11.24 @ 13:47) >    ACC: [id number] EXAM:  CT ANGIO CHEST PULM ART WAWIC   ORDERED BY:   RELL KELLER     PROCEDURE DATE:  12/11/2024          INTERPRETATION:  CLINICAL INFORMATION: Pleuritic chest pain and hypoxia.   Remote history of colon cancer..    COMPARISON: None.    CONTRAST/COMPLICATIONS:  IV Contrast: Omnipaque 350  60 cc administered   40 cc discarded  Oral Contrast: NONE  .    PROCEDURE:  CT Angiogram of the chest was obtained with intravenous contrast. Three   dimensional maximum intensity projection(MIP) images were generated.    FINDINGS:    AIRWAYS/LUNGS/PLEURA: Patent central airways. Mosaic attenuation of the   lungs, likely secondary to air trapping. Mild bilateral bronchial wall   thickening with scattered opacification of the distal airways likely   secondary to mucous plugging, worse in the right upper lobe. Right lower   lobe suture material and likely scarring. No pleural effusion. Scattered   calcified granulomas.    MEDIASTINUM AND SUSIE: 1.0 cm precarinal lymph node (5-53) and 1.0 cm AP   lymph node (5-53).    PULMONARY ANGIOGRAM: No pulmonary embolism.    VESSELS: Within normal limits.    HEART: Heart size is normal. Small filling defect in the left atrial   appendage may represent thrombus versus slow flow of contrast. No   pericardial effusion.    VISUALIZED UPPER ABDOMEN: Small hiatal hernia.    CHEST WALL AND BONES: Heterogeneous thyroid with subcentimeter hypodense   isthmus nodule.    IMPRESSION:    No pulmonary embolism.    Mild bilateral bronchial wall thickening with mucoid impaction. Findings   are infectious or inflammatory in etiology.    Filling defect in the left atrial appendage may represent thrombus versus   slow flow of contrast. Correlate with echocardiogram or CTA chest with   delayed imaging.    Heterogeneous appearing thyroid with hypodense nodule. Recommend   follow-up with nonemergent ultrasound.    --- End of Report ---          STEFANIA KAUFMAN MD; Resident Radiologist  This document has been electronically signed.  ELIANA PRADO DO; Attending Radiologist  This document has been electronically signed. Dec 11 2024  2:55PM    < end of copied text >    Imaging Personally Reviewed:  YES    Consultant(s) Notes Reviewed:   YES    Plan of care was discussed with patient and /or primary care giver; all questions and concerns were addressed and care was aligned with patient's wishes.

## 2024-12-12 NOTE — PROGRESS NOTE ADULT - PROBLEM SELECTOR PLAN 3
-CT Chest:   - No pulmonary embolism, Mild bilateral bronchial wall thickening with mucoid impaction.   - Filling defect in the left atrial appendage may represent thrombus versus slow flow of contrast.   - Heterogeneous appearing thyroid with hypodense nodule.    - Will f/u Echo  - Thyroid US outpatient -CT Chest:   - No pulmonary embolism, Mild bilateral bronchial wall thickening with mucoid impaction.   - Filling defect in the left atrial appendage may represent thrombus versus slow flow of contrast.   - Heterogeneous appearing thyroid with hypodense nodule.    - Will f/u Echo-preformed   - Thyroid US outpatient

## 2024-12-13 VITALS
DIASTOLIC BLOOD PRESSURE: 83 MMHG | SYSTOLIC BLOOD PRESSURE: 129 MMHG | TEMPERATURE: 98 F | RESPIRATION RATE: 18 BRPM | OXYGEN SATURATION: 95 % | HEART RATE: 95 BPM

## 2024-12-13 LAB
ALBUMIN SERPL ELPH-MCNC: 3.2 G/DL — LOW (ref 3.5–5)
ALP SERPL-CCNC: 69 U/L — SIGNIFICANT CHANGE UP (ref 40–120)
ALT FLD-CCNC: 14 U/L DA — SIGNIFICANT CHANGE UP (ref 10–60)
ANION GAP SERPL CALC-SCNC: 2 MMOL/L — LOW (ref 5–17)
AST SERPL-CCNC: 5 U/L — LOW (ref 10–40)
BILIRUB SERPL-MCNC: 0.5 MG/DL — SIGNIFICANT CHANGE UP (ref 0.2–1.2)
BUN SERPL-MCNC: 32 MG/DL — HIGH (ref 7–18)
CALCIUM SERPL-MCNC: 8.7 MG/DL — SIGNIFICANT CHANGE UP (ref 8.4–10.5)
CHLORIDE SERPL-SCNC: 113 MMOL/L — HIGH (ref 96–108)
CO2 SERPL-SCNC: 29 MMOL/L — SIGNIFICANT CHANGE UP (ref 22–31)
CREAT SERPL-MCNC: 0.78 MG/DL — SIGNIFICANT CHANGE UP (ref 0.5–1.3)
EGFR: 81 ML/MIN/1.73M2 — SIGNIFICANT CHANGE UP
GLUCOSE SERPL-MCNC: 139 MG/DL — HIGH (ref 70–99)
HCT VFR BLD CALC: 37.2 % — SIGNIFICANT CHANGE UP (ref 34.5–45)
HGB BLD-MCNC: 12.1 G/DL — SIGNIFICANT CHANGE UP (ref 11.5–15.5)
MCHC RBC-ENTMCNC: 29.1 PG — SIGNIFICANT CHANGE UP (ref 27–34)
MCHC RBC-ENTMCNC: 32.5 G/DL — SIGNIFICANT CHANGE UP (ref 32–36)
MCV RBC AUTO: 89.4 FL — SIGNIFICANT CHANGE UP (ref 80–100)
NRBC # BLD: 0 /100 WBCS — SIGNIFICANT CHANGE UP (ref 0–0)
PLATELET # BLD AUTO: 288 K/UL — SIGNIFICANT CHANGE UP (ref 150–400)
POTASSIUM SERPL-MCNC: 3.9 MMOL/L — SIGNIFICANT CHANGE UP (ref 3.5–5.3)
POTASSIUM SERPL-SCNC: 3.9 MMOL/L — SIGNIFICANT CHANGE UP (ref 3.5–5.3)
PROT SERPL-MCNC: 5.8 G/DL — LOW (ref 6–8.3)
RBC # BLD: 4.16 M/UL — SIGNIFICANT CHANGE UP (ref 3.8–5.2)
RBC # FLD: 12.5 % — SIGNIFICANT CHANGE UP (ref 10.3–14.5)
SODIUM SERPL-SCNC: 144 MMOL/L — SIGNIFICANT CHANGE UP (ref 135–145)
WBC # BLD: 11.59 K/UL — HIGH (ref 3.8–10.5)
WBC # FLD AUTO: 11.59 K/UL — HIGH (ref 3.8–10.5)

## 2024-12-13 PROCEDURE — 83735 ASSAY OF MAGNESIUM: CPT

## 2024-12-13 PROCEDURE — 83880 ASSAY OF NATRIURETIC PEPTIDE: CPT

## 2024-12-13 PROCEDURE — 93005 ELECTROCARDIOGRAM TRACING: CPT

## 2024-12-13 PROCEDURE — 82962 GLUCOSE BLOOD TEST: CPT

## 2024-12-13 PROCEDURE — 94640 AIRWAY INHALATION TREATMENT: CPT

## 2024-12-13 PROCEDURE — 99239 HOSP IP/OBS DSCHRG MGMT >30: CPT

## 2024-12-13 PROCEDURE — 71045 X-RAY EXAM CHEST 1 VIEW: CPT

## 2024-12-13 PROCEDURE — 84100 ASSAY OF PHOSPHORUS: CPT

## 2024-12-13 PROCEDURE — 80048 BASIC METABOLIC PNL TOTAL CA: CPT

## 2024-12-13 PROCEDURE — 71275 CT ANGIOGRAPHY CHEST: CPT | Mod: MC

## 2024-12-13 PROCEDURE — 82803 BLOOD GASES ANY COMBINATION: CPT

## 2024-12-13 PROCEDURE — 84484 ASSAY OF TROPONIN QUANT: CPT

## 2024-12-13 PROCEDURE — 85610 PROTHROMBIN TIME: CPT

## 2024-12-13 PROCEDURE — 93306 TTE W/DOPPLER COMPLETE: CPT

## 2024-12-13 PROCEDURE — 85027 COMPLETE CBC AUTOMATED: CPT

## 2024-12-13 PROCEDURE — 87637 SARSCOV2&INF A&B&RSV AMP PRB: CPT

## 2024-12-13 PROCEDURE — 80053 COMPREHEN METABOLIC PANEL: CPT

## 2024-12-13 PROCEDURE — 36415 COLL VENOUS BLD VENIPUNCTURE: CPT

## 2024-12-13 PROCEDURE — 85025 COMPLETE CBC W/AUTO DIFF WBC: CPT

## 2024-12-13 PROCEDURE — 99285 EMERGENCY DEPT VISIT HI MDM: CPT | Mod: 25

## 2024-12-13 PROCEDURE — 85730 THROMBOPLASTIN TIME PARTIAL: CPT

## 2024-12-13 RX ADMIN — Medication 10 MILLILITER(S): at 05:17

## 2024-12-13 RX ADMIN — AMLODIPINE BESYLATE 5 MILLIGRAM(S): 10 TABLET ORAL at 05:12

## 2024-12-13 RX ADMIN — IPRATROPIUM BROMIDE AND ALBUTEROL SULFATE 3 MILLILITER(S): 2.5; .5 SOLUTION RESPIRATORY (INHALATION) at 09:24

## 2024-12-13 RX ADMIN — Medication 10 MILLILITER(S): at 11:13

## 2024-12-13 NOTE — DISCHARGE NOTE PROVIDER - CARE PROVIDER_API CALL
derek Rabago  [facility]   [address] [facility] [address]  Phone: [phone]  Fax: (   )    -  Follow Up Time:

## 2024-12-13 NOTE — DISCHARGE NOTE NURSING/CASE MANAGEMENT/SOCIAL WORK - PATIENT PORTAL LINK FT
You can access the FollowMyHealth Patient Portal offered by [facility] by registering at the following website: [email or web address]. By joining [employer]’s FollowMyHealth portal, you will also be able to view your health information using other applications (apps) compatible with our system.

## 2024-12-13 NOTE — DISCHARGE NOTE PROVIDER - ATTENDING DISCHARGE PHYSICAL EXAMINATION:
Psych: AAO x3  Neuro: No gross focal deficits; Power and sensation intact  CVS: S1S2 present, regular, no edema  Resp: BLAE+, No wheeze or Rhonchi  GI: Soft, BS+, Non tender, non distended  Extr: No  calf tenderness B/L Lower extremities  Skin: Warm and moist without any rashes   Patient admitted with acute hypoxia due to acute bronchitis, transiently needing oxygen, weaned off, ambulating independently    P/E:  Psych: AAO x3  Neuro: No gross focal deficits; Power and sensation intact  CVS: S1S2 present, regular, no edema  Resp: BLAE+, No wheeze or Rhonchi  GI: Soft, BS+, Non tender, non distended  Extr: No  calf tenderness B/L Lower extremities  Skin: Warm and moist without any rashes    Plan:  D/C Home; f/u PCP  Thyroid USG to f/u thyroid nodule; TSH WNL  see d/c instructions reviewed and updated  Discussed with EDER Panda and RN and reviewed with family at bedside

## 2024-12-13 NOTE — DISCHARGE NOTE PROVIDER - PROVIDER TOKENS
FREE:[LAST:[Hima],FIRST:[derek],PHONE:[phone]],FAX:[(   )    -],ADDRESS:[[facility]   [address] [place] [id number]]]

## 2024-12-13 NOTE — DISCHARGE NOTE PROVIDER - NSFOLLOWUPCLINICS_GEN_ALL_ED_FT
[facility]  Endocrinology  [address] [place] [address]  Phone: [phone]  Fax:     A Cardiologist  Cardiology  .  [place]   Phone:   Fax:

## 2024-12-13 NOTE — DISCHARGE NOTE PROVIDER - NSDCCPCAREPLAN_GEN_ALL_CORE_FT
PRINCIPAL DISCHARGE DIAGNOSIS  Diagnosis: Acute respiratory failure with hypoxia  Assessment and Plan of Treatment: You presented to the hospital with  shortness of breath  and reported having fevers for 4days with a dry cough.  Your CT Angio chest showed -No pulmonary embolism. Mild bilateral bronchial wall thickening with mucoid impaction.  You were treated and managed with Robitussin and Steroids ( solu-medrol) with improvement and was weaned off oxygen.   You will need to follow up with your PCP within 1 week of discharge.      SECONDARY DISCHARGE DIAGNOSES  Diagnosis: Acute viral bronchitis  Assessment and Plan of Treatment: You were treated for acute brinchitis while in the hospital. Acute bronchitis is swelling and irritation in your lungs. It is usually caused by a virus and most often happens in the winter. Bronchitis may also be caused by bacteria or by a chemical irritant, such as smoke.   You were also on oxygen and weaned off of it sucessfully.   Self-care:  Drink liquids as directed to stay hydrated. Get more rest. Rest helps your body to heal. Slowly start to do more each day.   Prevent acute bronchitis: Get a flu vaccine each year as soon as recommended. Prevent the spread of germs. Wash your hands often with soap and water. Always cover your mouth when you cough to prevent the spread of germs. Avoid irritants in the air such as chemicals, fumes, and dust. Wear a face mask if you must work around dust or fumes. Do not smoke or be around others who are smoking.   Return to the emergency department if:  You cough up blood.  Your lips or fingernails turn blue.  You feel like you are not getting enough air when you breathe.    Diagnosis: Elevated troponin  Assessment and Plan of Treatment: You presented with Elevated troponin levels liklley due to demand ischemia.   High troponin levels can be a sign of a heart attack or other heart damage. Troponins are muscle proteins, and some are only found in the heart muscle. Damage to the heart causes troponin to be released into the bloodstream. Blood tests check for troponin. You did not have any chest pain while you were in the hospital.  You were seen by a cardiologist.   You also had an echocardiogram that showed an  ejection fraction of 67 % with Trace mitral regurgitation,  Mild aortic regurgitation, Trace tricuspid regurgitation,  Trace pulmonic regurgitation.    Please follow up with your cardiologist in the next week for routine monitoring and repeat blood work.    Diagnosis: HTN (hypertension)  Assessment and Plan of Treatment: You have a history of high blood pressure and should continue taking your home medication amlodipine.  High blood pressure is a condition that puts you at risk for heart attack, stroke and kidney disease. Please continue to take your medications as prescribed. You can also help control your blood pressure by maintaining a healthy weight, eating a diet low in fat and rich in fruits and vegetables, reduce the amount of salt in your diet. Also, reduce alcohol and try to include some form of physical activity daily for at least 30 mins. Follow up with your medical doctor to establish long term blood pressure treatment goals.  Notify your doctor if you have any of the following symptoms:   Dizziness, Lightheadedness, Blurry vision, Headache, Chest pain, Shortness of breath    Diagnosis: Thyroid nodule  Assessment and Plan of Treatment: You have a nodule noted to your thyroid and will require an ultrasound outpatient for further work up. Please follow up with an endocrinologist within 1 week of discharge.    Diagnosis: Abnormal CT scan  Assessment and Plan of Treatment: Given abnormal CT finding of possible atrial thrombus or delayed contrast.  You had an echocardiogram done inpatient which shows an  ejection fraction of 67 % with Trace mitral regurgitation,  Mild aortic regurgitation, Trace tricuspid regurgitation,  Trace pulmonic regurgitation.   You will need to follow up with Cardiology within 1 week of discharge for further medical managment.     PRINCIPAL DISCHARGE DIAGNOSIS  Diagnosis: Acute respiratory failure with hypoxia  Assessment and Plan of Treatment: You presented to the hospital with worsening shortness of breath and reportedly having fevers for 4 days with a dry cough. You was noted to have low oxygen saturation in Urgent care referred tro ED. You was saturating at around 90% on room air in ED.   Your CT Angio chest showed -No pulmonary embolism. Mild bilateral bronchial wall thickening with mucoid impaction. You was placed on supplemental oxygen in ED and  treated and managed with Robitussin and Steroids ( solu-medrol) with improvement and was weaned off oxygen. You likely had Acute Viral Bronchitis  Oxygen was tapered off and monitored over next 24 hrs and was given Incentive spirometry with clinical improvement and saturating 95% with ambulation.    You will need to follow up with your PCP within 1 week of discharge.      SECONDARY DISCHARGE DIAGNOSES  Diagnosis: HTN (hypertension)  Assessment and Plan of Treatment: You have a history of high blood pressure and should continue taking your home medication amlodipine.  Target Blood Pressure less than 140/80 mm Hg.CONTINUE AMLODIPINE 5 MG DAILY   High blood pressure is a condition that puts you at risk for heart attack, stroke and kidney disease. Please continue to take your medications as prescribed. You can also help control your blood pressure by maintaining a healthy weight, eating a diet low in fat and rich in fruits and vegetables, reduce the amount of salt in your diet. Also, reduce alcohol and try to include some form of physical activity daily for at least 30 mins. Follow up with your medical doctor to establish long term blood pressure treatment goals.  Notify your doctor if you have any of the following symptoms:   Dizziness, Lightheadedness, Blurry vision, Headache, Chest pain, Shortness of breath    Diagnosis: Acute viral bronchitis  Assessment and Plan of Treatment: You were treated for acute bronchitis while in the hospital. Acute bronchitis is swelling and irritation in your lungs. It is usually caused by a virus and most often happens in the winter. Bronchitis may also be caused by bacteria or by a chemical irritant, such as smoke.   You were also on oxygen and weaned off of it sucessfully.   Self-care:  Drink liquids as directed to stay hydrated. Get more rest. Rest helps your body to heal. Slowly start to do more each day.   Prevent acute bronchitis: Get a flu vaccine each year as soon as recommended. Prevent the spread of germs. Wash your hands often with soap and water. Always cover your mouth when you cough to prevent the spread of germs. Avoid irritants in the air such as chemicals, fumes, and dust. Wear a face mask if you must work around dust or fumes. Do not smoke or be around others who are smoking.   Return to the emergency department if:  You cough up blood.  Your lips or fingernails turn blue.  You feel like you are not getting enough air when you breathe.    Diagnosis: Elevated troponin  Assessment and Plan of Treatment: You presented with Elevated troponin levels liklley due to demand ischemia due to stress on heart from acute illness.  High troponin levels can be a sign of a heart attack or other heart damage. Troponins are muscle proteins, and some are only found in the heart muscle. Damage to the heart causes troponin to be released into the bloodstream. Blood tests check for troponin. You did not have any chest pain while you were in the hospital.  You also had an echocardiogram that showed an  ejection fraction of 67 % with Trace mitral regurgitation,  Mild aortic regurgitation, Trace tricuspid regurgitation,  Trace pulmonic regurgitation.    Diagnosis: Thyroid nodule  Assessment and Plan of Treatment: You have a nodule noted to your thyroid and will require an ultrasound outpatient for further work up. Please follow up with your PCP/ Endocrinologist for follow up and non emergent Throid Ultrasound as outpatient    Diagnosis: Abnormal CT scan  Assessment and Plan of Treatment: Given abnormal CT finding of possible atrial thrombus or delayed contrast.  You had an echocardiogram done inpatient which shows an  ejection fraction of 67 % with Trace mitral regurgitation,  Mild aortic regurgitation, Trace tricuspid regurgitation,  Trace pulmonic regurgitation. No significant findings on Echovcardiogram noted.     PRINCIPAL DISCHARGE DIAGNOSIS  Diagnosis: Acute respiratory failure with hypoxia  Assessment and Plan of Treatment: You presented to the hospital with worsening shortness of breath and reportedly having fevers for 4 days with a dry cough. You was noted to have low oxygen saturation in Urgent care referred tro ED. You was saturating at around 90% on room air in ED.   Your CT Angio chest showed -No pulmonary embolism. Mild bilateral bronchial wall thickening with mucoid impaction. You was placed on supplemental oxygen in ED and  treated and managed with Robitussin and Steroids ( solu-medrol) with improvement and was weaned off oxygen. You likely had Acute Viral Bronchitis  Oxygen was tapered off and monitored over next 24 hrs and was given Incentive spirometry with clinical improvement and saturating 95% with ambulation.    You will need to follow up with your PCP within 1 week of discharge.      SECONDARY DISCHARGE DIAGNOSES  Diagnosis: HTN (hypertension)  Assessment and Plan of Treatment: You have a history of high blood pressure and should continue taking your home medication amlodipine.  Target Blood Pressure less than 140/80 mm Hg.CONTINUE AMLODIPINE 5 MG DAILY   High blood pressure is a condition that puts you at risk for heart attack, stroke and kidney disease. Please continue to take your medications as prescribed. You can also help control your blood pressure by maintaining a healthy weight, eating a diet low in fat and rich in fruits and vegetables, reduce the amount of salt in your diet. Also, reduce alcohol and try to include some form of physical activity daily for at least 30 mins. Follow up with your medical doctor to establish long term blood pressure treatment goals.  Notify your doctor if you have any of the following symptoms:   Dizziness, Lightheadedness, Blurry vision, Headache, Chest pain, Shortness of breath    Diagnosis: Acute viral bronchitis  Assessment and Plan of Treatment: You were treated for acute bronchitis while in the hospital. Acute bronchitis is swelling and irritation in your lungs. It is usually caused by a virus and most often happens in the winter. Bronchitis may also be caused by bacteria or by a chemical irritant, such as smoke.   You were also on oxygen and weaned off of it sucessfully.   Self-care:  Drink liquids as directed to stay hydrated. Get more rest. Rest helps your body to heal. Slowly start to do more each day.   Prevent acute bronchitis: Get a flu vaccine each year as soon as recommended. Prevent the spread of germs. Wash your hands often with soap and water. Always cover your mouth when you cough to prevent the spread of germs. Avoid irritants in the air such as chemicals, fumes, and dust. Wear a face mask if you must work around dust or fumes. Do not smoke or be around others who are smoking.   Return to the emergency department if:  You cough up blood.  Your lips or fingernails turn blue.  You feel like you are not getting enough air when you breathe.    Diagnosis: Elevated troponin  Assessment and Plan of Treatment: You presented with Elevated troponin levels liklley due to demand ischemia due to stress on heart from acute illness.  High troponin levels can be a sign of a heart attack or other heart damage. Troponins are muscle proteins, and some are only found in the heart muscle. Damage to the heart causes troponin to be released into the bloodstream. Blood tests check for troponin. You did not have any chest pain while you were in the hospital.  You also had an echocardiogram that showed an  ejection fraction of 67 % with Trace mitral regurgitation,  Mild aortic regurgitation, Trace tricuspid regurgitation,  Trace pulmonic regurgitation.    Diagnosis: Thyroid nodule  Assessment and Plan of Treatment: You have a nodule noted to your thyroid and will require an ultrasound outpatient for further work up. Please follow up with your PCP/ Endocrinologist for follow up and non emergent Throid Ultrasound as outpatient    Diagnosis: Abnormal CT scan  Assessment and Plan of Treatment: Given abnormal CT finding of possible atrial thrombus or delayed contrast.  You had an echocardiogram done inpatient which shows an  ejection fraction of 67 % with Trace mitral regurgitation,  Mild aortic regurgitation, Trace tricuspid regurgitation,  Trace pulmonic regurgitation. No significant findings on Echovcardiogram noted.    Diagnosis: Leucocytosis  Assessment and Plan of Treatment: You had a mildly elevated WBC count due to steroids you received in ED. No fever during hospital stay. Follow up with PCP in one week. Repeat Blood work in 7 to 10 days

## 2024-12-13 NOTE — DISCHARGE NOTE PROVIDER - HOSPITAL COURSE
Patient is a 74 year old female from home, ambulates independently, with PMH of HTN, and remote history of colon cancer who presented to the ED with shortness of breath.  In the ED patient hemodynamically stable and afebrile on RA.  Patients labs grossly unrevealing  Patient is being admitted to medicine for Acute hypoxic respiratory failure 2/2 viral bronchitis   Pt appears well and symptoms improving, O2 discontinued 95% RA and incentive spirometer in place.     Please note that this a brief summary of hospital course please refer to daily progress notes and consult notes for full course and events. Patient seen and examined at bedside, discussed with medical attending. Patient medically cleared for discharge to home. Patient is a 74 year old female from home, ambulates independently, with PMH of HTN, and remote history of colon cancer who presented to the ED with shortness of breath.  In the ED patient hemodynamically stable and afebrile on RA.  Patients labs grossly unrevealing  Patient is being admitted to medicine for Acute hypoxic respiratory failure 2/2 viral bronchitis. Treated with Steroids, Robitussin and supportive measures such as oxygen and the use of incentive spirometer.   Pt appears well and symptoms improving, O2 discontinued 95% RA on exertion.     Please note that this a brief summary of hospital course please refer to daily progress notes and consult notes for full course and events. Patient seen and examined at bedside, discussed with medical attending. Patient medically cleared for discharge to home.

## 2024-12-13 NOTE — CHART NOTE - NSCHARTNOTEFT_GEN_A_CORE
EVENT: Nurse reporting O2 sat 90% on room air used NC 1 L overnight    BRIEF HPI: 74 year old female from home, ambulates independently, with PMH of HTN, and remote history of colon cancer who presented to the ED with shortness of breath.  In the ED patient hemodynamically stable and afebrile on RA.  Patients labs grossly unrevealing  Patient is being admitted to medicine for Acute hypoxic respiratory failure 2/2 viral bronchitis   Pt appears well and symptoms improving, O2 discontinued 92% RA and incentive spirometer in place.     Vital Signs Last 24 Hrs  T(C): 36.7 (13 Dec 2024 01:01), Max: 36.9 (12 Dec 2024 21:18)  T(F): 98.1 (13 Dec 2024 01:01), Max: 98.5 (12 Dec 2024 21:18)  HR: 74 (13 Dec 2024 01:01) (67 - 82)  BP: 108/92 (13 Dec 2024 01:01) (108/92 - 136/79)  BP(mean): 97 (13 Dec 2024 01:01) (96 - 97)  RR: 18 (13 Dec 2024 01:20) (16 - 18)  SpO2: 95% (13 Dec 2024 01:20) (90% - 95%)    Parameters below as of 13 Dec 2024 01:20  Patient On (Oxygen Delivery Method): nasal cannula  O2 Flow (L/min): 1    PLAN  Ambulatory O2 to assess O2 need  Cont albuterol/ipratropium for Nebulization 3 milliliter(s) Nebulizer every 6 hours    PLAN: Follow up pulse ox  trend on room air

## 2024-12-13 NOTE — DISCHARGE NOTE PROVIDER - NSDCMRMEDTOKEN_GEN_ALL_CORE_FT
amLODIPine 5 mg oral tablet: 1 tab(s) orally once a day  meloxicam 15 mg oral tablet: 1 tab(s) orally once a day

## 2024-12-13 NOTE — DISCHARGE NOTE NURSING/CASE MANAGEMENT/SOCIAL WORK - FINANCIAL ASSISTANCE
[facility] provides services at a reduced cost to those who are determined to be eligible through [facility]’s financial assistance program. Information regarding [facility]’s financial assistance program can be found by going to [email or web address] or by calling [phone].

## 2025-06-19 ENCOUNTER — EMERGENCY (EMERGENCY)
Facility: HOSPITAL | Age: 75
LOS: 1 days | End: 2025-06-19
Attending: EMERGENCY MEDICINE
Payer: MEDICAID

## 2025-06-19 VITALS
OXYGEN SATURATION: 95 % | TEMPERATURE: 98 F | HEART RATE: 76 BPM | SYSTOLIC BLOOD PRESSURE: 131 MMHG | DIASTOLIC BLOOD PRESSURE: 79 MMHG | HEIGHT: 63 IN | WEIGHT: 156.09 LBS | RESPIRATION RATE: 16 BRPM

## 2025-06-19 VITALS
HEART RATE: 78 BPM | DIASTOLIC BLOOD PRESSURE: 72 MMHG | SYSTOLIC BLOOD PRESSURE: 111 MMHG | OXYGEN SATURATION: 96 % | RESPIRATION RATE: 18 BRPM | TEMPERATURE: 98 F

## 2025-06-19 PROBLEM — C18.9 MALIGNANT NEOPLASM OF COLON, UNSPECIFIED: Chronic | Status: ACTIVE | Noted: 2024-12-11

## 2025-06-19 PROBLEM — I10 ESSENTIAL (PRIMARY) HYPERTENSION: Chronic | Status: ACTIVE | Noted: 2024-12-11

## 2025-06-19 LAB
ALBUMIN SERPL ELPH-MCNC: 3.5 G/DL — SIGNIFICANT CHANGE UP (ref 3.5–5)
ALP SERPL-CCNC: 89 U/L — SIGNIFICANT CHANGE UP (ref 40–120)
ALT FLD-CCNC: 14 U/L DA — SIGNIFICANT CHANGE UP (ref 10–60)
ANION GAP SERPL CALC-SCNC: 4 MMOL/L — LOW (ref 5–17)
APPEARANCE UR: ABNORMAL
AST SERPL-CCNC: 11 U/L — SIGNIFICANT CHANGE UP (ref 10–40)
BASOPHILS # BLD AUTO: 0.05 K/UL — SIGNIFICANT CHANGE UP (ref 0–0.2)
BASOPHILS NFR BLD AUTO: 0.6 % — SIGNIFICANT CHANGE UP (ref 0–2)
BILIRUB SERPL-MCNC: 0.4 MG/DL — SIGNIFICANT CHANGE UP (ref 0.2–1.2)
BILIRUB UR-MCNC: NEGATIVE — SIGNIFICANT CHANGE UP
BUN SERPL-MCNC: 16 MG/DL — SIGNIFICANT CHANGE UP (ref 7–18)
CALCIUM SERPL-MCNC: 9 MG/DL — SIGNIFICANT CHANGE UP (ref 8.4–10.5)
CHLORIDE SERPL-SCNC: 106 MMOL/L — SIGNIFICANT CHANGE UP (ref 96–108)
CO2 SERPL-SCNC: 29 MMOL/L — SIGNIFICANT CHANGE UP (ref 22–31)
COLOR SPEC: YELLOW — SIGNIFICANT CHANGE UP
COMMENT - URINE: SIGNIFICANT CHANGE UP
CREAT SERPL-MCNC: 0.66 MG/DL — SIGNIFICANT CHANGE UP (ref 0.5–1.3)
DIFF PNL FLD: NEGATIVE — SIGNIFICANT CHANGE UP
EGFR: 93 ML/MIN/1.73M2 — SIGNIFICANT CHANGE UP
EGFR: 93 ML/MIN/1.73M2 — SIGNIFICANT CHANGE UP
EOSINOPHIL # BLD AUTO: 0.05 K/UL — SIGNIFICANT CHANGE UP (ref 0–0.5)
EOSINOPHIL NFR BLD AUTO: 0.6 % — SIGNIFICANT CHANGE UP (ref 0–6)
EPI CELLS # UR: PRESENT
GLUCOSE SERPL-MCNC: 129 MG/DL — HIGH (ref 70–99)
GLUCOSE UR QL: NEGATIVE MG/DL — SIGNIFICANT CHANGE UP
HCT VFR BLD CALC: 42.8 % — SIGNIFICANT CHANGE UP (ref 34.5–45)
HGB BLD-MCNC: 13.9 G/DL — SIGNIFICANT CHANGE UP (ref 11.5–15.5)
IMM GRANULOCYTES NFR BLD AUTO: 0.3 % — SIGNIFICANT CHANGE UP (ref 0–0.9)
KETONES UR QL: ABNORMAL MG/DL
LEUKOCYTE ESTERASE UR-ACNC: NEGATIVE — SIGNIFICANT CHANGE UP
LYMPHOCYTES # BLD AUTO: 1.27 K/UL — SIGNIFICANT CHANGE UP (ref 1–3.3)
LYMPHOCYTES # BLD AUTO: 16.4 % — SIGNIFICANT CHANGE UP (ref 13–44)
MAGNESIUM SERPL-MCNC: 2.1 MG/DL — SIGNIFICANT CHANGE UP (ref 1.6–2.6)
MCHC RBC-ENTMCNC: 29.8 PG — SIGNIFICANT CHANGE UP (ref 27–34)
MCHC RBC-ENTMCNC: 32.5 G/DL — SIGNIFICANT CHANGE UP (ref 32–36)
MCV RBC AUTO: 91.8 FL — SIGNIFICANT CHANGE UP (ref 80–100)
MONOCYTES # BLD AUTO: 0.41 K/UL — SIGNIFICANT CHANGE UP (ref 0–0.9)
MONOCYTES NFR BLD AUTO: 5.3 % — SIGNIFICANT CHANGE UP (ref 2–14)
NEUTROPHILS # BLD AUTO: 5.95 K/UL — SIGNIFICANT CHANGE UP (ref 1.8–7.4)
NEUTROPHILS NFR BLD AUTO: 76.8 % — SIGNIFICANT CHANGE UP (ref 43–77)
NITRITE UR-MCNC: NEGATIVE — SIGNIFICANT CHANGE UP
NRBC BLD AUTO-RTO: 0 /100 WBCS — SIGNIFICANT CHANGE UP (ref 0–0)
PH UR: 8.5 (ref 5–8)
PLATELET # BLD AUTO: 235 K/UL — SIGNIFICANT CHANGE UP (ref 150–400)
POTASSIUM SERPL-MCNC: 3.9 MMOL/L — SIGNIFICANT CHANGE UP (ref 3.5–5.3)
POTASSIUM SERPL-SCNC: 3.9 MMOL/L — SIGNIFICANT CHANGE UP (ref 3.5–5.3)
PROT SERPL-MCNC: 6.7 G/DL — SIGNIFICANT CHANGE UP (ref 6–8.3)
PROT UR-MCNC: NEGATIVE MG/DL — SIGNIFICANT CHANGE UP
RBC # BLD: 4.66 M/UL — SIGNIFICANT CHANGE UP (ref 3.8–5.2)
RBC # FLD: 12.5 % — SIGNIFICANT CHANGE UP (ref 10.3–14.5)
RBC CASTS # UR COMP ASSIST: 0 /HPF — SIGNIFICANT CHANGE UP (ref 0–4)
SODIUM SERPL-SCNC: 139 MMOL/L — SIGNIFICANT CHANGE UP (ref 135–145)
SP GR SPEC: 1.01 — SIGNIFICANT CHANGE UP (ref 1–1.03)
TROPONIN I, HIGH SENSITIVITY RESULT: 38.4 NG/L — SIGNIFICANT CHANGE UP
UROBILINOGEN FLD QL: 0.2 MG/DL — SIGNIFICANT CHANGE UP (ref 0.2–1)
WBC # BLD: 7.75 K/UL — SIGNIFICANT CHANGE UP (ref 3.8–10.5)
WBC # FLD AUTO: 7.75 K/UL — SIGNIFICANT CHANGE UP (ref 3.8–10.5)
WBC UR QL: 0 /HPF — SIGNIFICANT CHANGE UP (ref 0–5)

## 2025-06-19 PROCEDURE — 83735 ASSAY OF MAGNESIUM: CPT

## 2025-06-19 PROCEDURE — 36415 COLL VENOUS BLD VENIPUNCTURE: CPT

## 2025-06-19 PROCEDURE — 99285 EMERGENCY DEPT VISIT HI MDM: CPT | Mod: 25

## 2025-06-19 PROCEDURE — 99285 EMERGENCY DEPT VISIT HI MDM: CPT

## 2025-06-19 PROCEDURE — 93010 ELECTROCARDIOGRAM REPORT: CPT

## 2025-06-19 PROCEDURE — 80053 COMPREHEN METABOLIC PANEL: CPT

## 2025-06-19 PROCEDURE — 81001 URINALYSIS AUTO W/SCOPE: CPT

## 2025-06-19 PROCEDURE — 93005 ELECTROCARDIOGRAM TRACING: CPT

## 2025-06-19 PROCEDURE — 71045 X-RAY EXAM CHEST 1 VIEW: CPT | Mod: 26

## 2025-06-19 PROCEDURE — 84484 ASSAY OF TROPONIN QUANT: CPT

## 2025-06-19 PROCEDURE — 82962 GLUCOSE BLOOD TEST: CPT

## 2025-06-19 PROCEDURE — 71045 X-RAY EXAM CHEST 1 VIEW: CPT

## 2025-06-19 PROCEDURE — 85025 COMPLETE CBC W/AUTO DIFF WBC: CPT

## 2025-06-19 RX ADMIN — Medication 1000 MILLILITER(S): at 11:04
